# Patient Record
Sex: FEMALE
[De-identification: names, ages, dates, MRNs, and addresses within clinical notes are randomized per-mention and may not be internally consistent; named-entity substitution may affect disease eponyms.]

---

## 2017-11-14 PROBLEM — Z00.00 ENCOUNTER FOR PREVENTIVE HEALTH EXAMINATION: Status: ACTIVE | Noted: 2017-11-14

## 2017-11-15 ENCOUNTER — LABORATORY RESULT (OUTPATIENT)
Age: 47
End: 2017-11-15

## 2017-11-15 ENCOUNTER — APPOINTMENT (OUTPATIENT)
Dept: ENDOCRINOLOGY | Facility: CLINIC | Age: 47
End: 2017-11-15
Payer: MEDICAID

## 2017-11-15 VITALS
BODY MASS INDEX: 29.02 KG/M2 | WEIGHT: 170 LBS | DIASTOLIC BLOOD PRESSURE: 83 MMHG | HEART RATE: 66 BPM | SYSTOLIC BLOOD PRESSURE: 127 MMHG | HEIGHT: 64 IN

## 2017-11-15 DIAGNOSIS — E55.9 VITAMIN D DEFICIENCY, UNSPECIFIED: ICD-10-CM

## 2017-11-15 DIAGNOSIS — E01.0 IODINE-DEFICIENCY RELATED DIFFUSE (ENDEMIC) GOITER: ICD-10-CM

## 2017-11-15 DIAGNOSIS — Z83.49 FAMILY HISTORY OF OTHER ENDOCRINE, NUTRITIONAL AND METABOLIC DISEASES: ICD-10-CM

## 2017-11-15 DIAGNOSIS — Z13.29 ENCOUNTER FOR SCREENING FOR OTHER SUSPECTED ENDOCRINE DISORDER: ICD-10-CM

## 2017-11-15 PROCEDURE — 99204 OFFICE O/P NEW MOD 45 MIN: CPT

## 2017-11-16 LAB
25(OH)D3 SERPL-MCNC: 10.2 NG/ML
ALBUMIN SERPL ELPH-MCNC: 4.9 G/DL
ALP BLD-CCNC: 77 U/L
ALT SERPL-CCNC: 23 U/L
ANION GAP SERPL CALC-SCNC: 19 MMOL/L
AST SERPL-CCNC: 19 U/L
BILIRUB SERPL-MCNC: 0.2 MG/DL
BUN SERPL-MCNC: 10 MG/DL
CALCIUM SERPL-MCNC: 10.3 MG/DL
CHLORIDE SERPL-SCNC: 102 MMOL/L
CO2 SERPL-SCNC: 22 MMOL/L
CREAT SERPL-MCNC: 0.68 MG/DL
GLUCOSE SERPL-MCNC: 109 MG/DL
HBA1C MFR BLD HPLC: 6.3 %
POTASSIUM SERPL-SCNC: 4.7 MMOL/L
PROT SERPL-MCNC: 8.1 G/DL
SODIUM SERPL-SCNC: 143 MMOL/L
TSH SERPL-ACNC: 2.52 UIU/ML

## 2017-11-20 PROBLEM — E55.9 VITAMIN D INSUFFICIENCY: Status: ACTIVE | Noted: 2017-11-20

## 2017-11-20 RX ORDER — ERGOCALCIFEROL 1.25 MG/1
1.25 MG CAPSULE, LIQUID FILLED ORAL
Qty: 12 | Refills: 3 | Status: ACTIVE | COMMUNITY
Start: 2017-11-20 | End: 1900-01-01

## 2017-11-22 ENCOUNTER — RESULT REVIEW (OUTPATIENT)
Age: 47
End: 2017-11-22

## 2017-12-06 ENCOUNTER — RESULT REVIEW (OUTPATIENT)
Age: 47
End: 2017-12-06

## 2017-12-18 ENCOUNTER — APPOINTMENT (OUTPATIENT)
Dept: ENDOCRINOLOGY | Facility: CLINIC | Age: 47
End: 2017-12-18

## 2017-12-27 ENCOUNTER — APPOINTMENT (OUTPATIENT)
Dept: ENDOCRINOLOGY | Facility: CLINIC | Age: 47
End: 2017-12-27
Payer: MEDICAID

## 2017-12-27 VITALS
HEIGHT: 64 IN | HEART RATE: 98 BPM | WEIGHT: 158 LBS | DIASTOLIC BLOOD PRESSURE: 81 MMHG | BODY MASS INDEX: 26.98 KG/M2 | SYSTOLIC BLOOD PRESSURE: 136 MMHG

## 2017-12-27 PROCEDURE — 36415 COLL VENOUS BLD VENIPUNCTURE: CPT

## 2017-12-27 PROCEDURE — 99214 OFFICE O/P EST MOD 30 MIN: CPT | Mod: 25

## 2018-01-04 ENCOUNTER — OTHER (OUTPATIENT)
Age: 48
End: 2018-01-04

## 2018-01-04 DIAGNOSIS — E05.90 THYROTOXICOSIS, UNSPECIFIED W/OUT THYROTOXIC CRISIS OR STORM: ICD-10-CM

## 2018-01-04 LAB
ALBUMIN SERPL ELPH-MCNC: 4.2 G/DL
ALP BLD-CCNC: 68 U/L
ALT SERPL-CCNC: 17 U/L
ANION GAP SERPL CALC-SCNC: 15 MMOL/L
AST SERPL-CCNC: 85 U/L
BILIRUB SERPL-MCNC: 0.2 MG/DL
BUN SERPL-MCNC: 13 MG/DL
CALCIUM SERPL-MCNC: 10.3 MG/DL
CHLORIDE SERPL-SCNC: 98 MMOL/L
CO2 SERPL-SCNC: 25 MMOL/L
CREAT SERPL-MCNC: 0.97 MG/DL
GLUCOSE SERPL-MCNC: 86 MG/DL
POTASSIUM SERPL-SCNC: 4.5 MMOL/L
PROT SERPL-MCNC: 8.9 G/DL
SODIUM SERPL-SCNC: 138 MMOL/L
T3FREE SERPL-MCNC: 4.57 PG/ML
T4 FREE SERPL-MCNC: 2.4 NG/DL
THYROGLOB AB SERPL-ACNC: <20 IU/ML
THYROPEROXIDASE AB SERPL IA-ACNC: 17.6 IU/ML
TSH SERPL-ACNC: 0.02 UIU/ML

## 2018-01-08 ENCOUNTER — LABORATORY RESULT (OUTPATIENT)
Age: 48
End: 2018-01-08

## 2018-01-10 ENCOUNTER — APPOINTMENT (OUTPATIENT)
Dept: ENDOCRINOLOGY | Facility: CLINIC | Age: 48
End: 2018-01-10
Payer: MEDICAID

## 2018-01-10 VITALS
DIASTOLIC BLOOD PRESSURE: 71 MMHG | HEIGHT: 64 IN | BODY MASS INDEX: 27.31 KG/M2 | WEIGHT: 160 LBS | SYSTOLIC BLOOD PRESSURE: 116 MMHG

## 2018-01-10 PROCEDURE — 99214 OFFICE O/P EST MOD 30 MIN: CPT

## 2018-01-16 ENCOUNTER — APPOINTMENT (OUTPATIENT)
Dept: ENDOCRINOLOGY | Facility: CLINIC | Age: 48
End: 2018-01-16

## 2018-03-28 ENCOUNTER — APPOINTMENT (OUTPATIENT)
Dept: ENDOCRINOLOGY | Facility: CLINIC | Age: 48
End: 2018-03-28

## 2018-04-25 ENCOUNTER — LABORATORY RESULT (OUTPATIENT)
Age: 48
End: 2018-04-25

## 2018-04-25 ENCOUNTER — APPOINTMENT (OUTPATIENT)
Dept: ENDOCRINOLOGY | Facility: CLINIC | Age: 48
End: 2018-04-25
Payer: MEDICAID

## 2018-04-25 VITALS
WEIGHT: 160 LBS | DIASTOLIC BLOOD PRESSURE: 61 MMHG | BODY MASS INDEX: 27.31 KG/M2 | HEIGHT: 64 IN | SYSTOLIC BLOOD PRESSURE: 128 MMHG

## 2018-04-25 PROCEDURE — 99213 OFFICE O/P EST LOW 20 MIN: CPT

## 2018-05-30 ENCOUNTER — LABORATORY RESULT (OUTPATIENT)
Age: 48
End: 2018-05-30

## 2018-05-30 ENCOUNTER — APPOINTMENT (OUTPATIENT)
Dept: ENDOCRINOLOGY | Facility: CLINIC | Age: 48
End: 2018-05-30
Payer: MEDICAID

## 2018-05-30 VITALS — DIASTOLIC BLOOD PRESSURE: 50 MMHG | SYSTOLIC BLOOD PRESSURE: 110 MMHG | HEIGHT: 64 IN

## 2018-05-30 DIAGNOSIS — E06.1 SUBACUTE THYROIDITIS: ICD-10-CM

## 2018-05-30 PROCEDURE — 99214 OFFICE O/P EST MOD 30 MIN: CPT

## 2018-05-30 RX ORDER — LEVOTHYROXINE SODIUM 0.05 MG/1
50 TABLET ORAL DAILY
Qty: 30 | Refills: 3 | Status: ACTIVE | COMMUNITY
Start: 2018-05-30 | End: 1900-01-01

## 2018-06-20 ENCOUNTER — OTHER (OUTPATIENT)
Age: 48
End: 2018-06-20

## 2018-07-25 ENCOUNTER — APPOINTMENT (OUTPATIENT)
Dept: ENDOCRINOLOGY | Facility: CLINIC | Age: 48
End: 2018-07-25

## 2018-08-01 ENCOUNTER — APPOINTMENT (OUTPATIENT)
Dept: ENDOCRINOLOGY | Facility: CLINIC | Age: 48
End: 2018-08-01
Payer: MEDICAID

## 2018-08-01 VITALS
SYSTOLIC BLOOD PRESSURE: 128 MMHG | WEIGHT: 176 LBS | HEIGHT: 64 IN | BODY MASS INDEX: 30.05 KG/M2 | DIASTOLIC BLOOD PRESSURE: 70 MMHG

## 2018-08-01 PROCEDURE — 99213 OFFICE O/P EST LOW 20 MIN: CPT

## 2018-08-16 ENCOUNTER — OTHER (OUTPATIENT)
Age: 48
End: 2018-08-16

## 2018-08-16 ENCOUNTER — RESULT CHARGE (OUTPATIENT)
Age: 48
End: 2018-08-16

## 2018-08-16 DIAGNOSIS — E03.9 HYPOTHYROIDISM, UNSPECIFIED: ICD-10-CM

## 2018-08-16 RX ORDER — LEVOTHYROXINE SODIUM 0.05 MG/1
50 TABLET ORAL
Qty: 90 | Refills: 2 | Status: ACTIVE | COMMUNITY
Start: 2018-08-16 | End: 1900-01-01

## 2018-10-03 ENCOUNTER — APPOINTMENT (OUTPATIENT)
Dept: ENDOCRINOLOGY | Facility: CLINIC | Age: 48
End: 2018-10-03
Payer: MEDICAID

## 2018-10-03 PROCEDURE — 99213 OFFICE O/P EST LOW 20 MIN: CPT

## 2018-10-04 ENCOUNTER — LABORATORY RESULT (OUTPATIENT)
Age: 48
End: 2018-10-04

## 2018-10-05 VITALS
DIASTOLIC BLOOD PRESSURE: 71 MMHG | SYSTOLIC BLOOD PRESSURE: 131 MMHG | WEIGHT: 192 LBS | BODY MASS INDEX: 32.78 KG/M2 | HEIGHT: 64 IN

## 2018-10-09 ENCOUNTER — LABORATORY RESULT (OUTPATIENT)
Age: 48
End: 2018-10-09

## 2018-11-21 ENCOUNTER — RESULT CHARGE (OUTPATIENT)
Age: 48
End: 2018-11-21

## 2018-12-05 ENCOUNTER — APPOINTMENT (OUTPATIENT)
Dept: ENDOCRINOLOGY | Facility: CLINIC | Age: 48
End: 2018-12-05

## 2019-01-16 ENCOUNTER — LABORATORY RESULT (OUTPATIENT)
Age: 49
End: 2019-01-16

## 2019-01-16 ENCOUNTER — APPOINTMENT (OUTPATIENT)
Dept: ENDOCRINOLOGY | Facility: CLINIC | Age: 49
End: 2019-01-16
Payer: MEDICAID

## 2019-01-16 VITALS
HEIGHT: 64 IN | DIASTOLIC BLOOD PRESSURE: 79 MMHG | SYSTOLIC BLOOD PRESSURE: 116 MMHG | BODY MASS INDEX: 30.05 KG/M2 | WEIGHT: 176 LBS

## 2019-01-16 DIAGNOSIS — E04.2 NONTOXIC MULTINODULAR GOITER: ICD-10-CM

## 2019-01-16 PROCEDURE — 99214 OFFICE O/P EST MOD 30 MIN: CPT

## 2019-01-17 PROBLEM — E04.2 MULTINODULAR GOITER (NONTOXIC): Status: ACTIVE | Noted: 2017-11-15

## 2019-01-17 NOTE — HISTORY OF PRESENT ILLNESS
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 October 2017 thyroid u/s report: \par Right lobe: 3 nodules 0.2 x 0.2 x 0.2; 0.3 x 0.2 x 0.3 and 0.2 x 0.2 x 0.2\par Left lobe: nodule 3.6 x 1.2 x 1.9.\par 11/23/17. Thyroid FNA left mid pole: [place]: II.\par Initial endocrine visit on November 15, 2017, clinically euthyroid with tsh 2.5\par 12/27/17 TPO 17.6, t4 free 2.4\par 1/8/18,tsh 0.61, t4 free 0.9  ,tshrab : neg, TPO ab 17.6\par 4/25/18, TSH 6.30,T4 1.0,\par May 30,2018,TSH 6.1, T4 free 1.0\par 8/2/18, t4 free 1.2\par Overall she is doing well, denies palpitation, weight loss\par Meds lt4 50 mcg since May 2018 until the summer of 2018\par She is feeling well, no c/o. She had a thyroid u/s in [place] , no nodules.\par Denies palpitations,GI disturbances

## 2019-01-17 NOTE — REVIEW OF SYSTEMS
[Dysphagia] : no dysphagia [Dysphonia] : no dysphonia [Polydipsia] : no polydipsia [Cold Intolerance] : cold tolerant [Heat Intolerance] : heat tolerant [Negative] : Respiratory

## 2019-01-17 NOTE — ASSESSMENT
[FreeTextEntry1] : #1- thyroid nodules. FNA biopsy : [place] II\par Asymptomatic\par Monitor thyroid nodule size growth rate (  Left 3.6 cm nodule)\par Order thyroid u/s\par \par #2 Thyroiditis, Nov 2017\par She is clinically euthyroid, monitor TFT\par \par Return in 2 months

## 2019-01-17 NOTE — PHYSICAL EXAM
[Alert] : alert [Normal Sclera/Conjunctiva] : normal sclera/conjunctiva [Carotids Normal] : carotid pulses were normal with no bruits [Normal Bowel Sounds] : normal bowel sounds [de-identified] : thyroid nodularities

## 2019-02-27 ENCOUNTER — APPOINTMENT (OUTPATIENT)
Dept: ENDOCRINOLOGY | Facility: CLINIC | Age: 49
End: 2019-02-27
Payer: MEDICAID

## 2019-02-27 VITALS
SYSTOLIC BLOOD PRESSURE: 117 MMHG | DIASTOLIC BLOOD PRESSURE: 77 MMHG | WEIGHT: 174 LBS | BODY MASS INDEX: 29.71 KG/M2 | HEIGHT: 64 IN

## 2019-02-27 DIAGNOSIS — Z86.39 PERSONAL HISTORY OF OTHER ENDOCRINE, NUTRITIONAL AND METABOLIC DISEASE: ICD-10-CM

## 2019-02-27 DIAGNOSIS — E04.1 NONTOXIC SINGLE THYROID NODULE: ICD-10-CM

## 2019-02-27 PROCEDURE — 99213 OFFICE O/P EST LOW 20 MIN: CPT

## 2019-02-27 NOTE — ASSESSMENT
[FreeTextEntry1] : #1- thyroid nodules. FNA biopsy : [place] II\par Asymptomatic\par Monitor thyroid nodule size growth rate (  Left 3.6 cm nodule)\par Jan 2019 thyroid u/s lef  1.0 x 0.4 cm nodule\par Thyroid u/s in 2 years\par \par #2 Thyroiditis, Nov 2017\par She is clinically euthyroid, clinically euthyroid\par No need for thyroid supplementation ( lt4 held October 2018)\par Monitor TFT\par \par Return in May if  TFT is abn

## 2019-02-27 NOTE — REVIEW OF SYSTEMS
[Negative] : Psychiatric [Redness] : no redness  [Dysphagia] : no dysphagia [Dysphonia] : no dysphonia [Headache] : no headaches [Tremors] : no tremors [Polydipsia] : no polydipsia [Cold Intolerance] : cold tolerant [Heat Intolerance] : heat tolerant

## 2019-02-27 NOTE — PHYSICAL EXAM
[Alert] : alert [Normal Sclera/Conjunctiva] : normal sclera/conjunctiva [Carotids Normal] : carotid pulses were normal with no bruits [Normal Bowel Sounds] : normal bowel sounds [de-identified] : thyroid nodularities

## 2023-02-05 NOTE — HISTORY OF PRESENT ILLNESS
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 October 2017 thyroid u/s report: \par Right lobe: 3 nodules 0.2 x 0.2 x 0.2; 0.3 x 0.2 x 0.3 and 0.2 x 0.2 x 0.2\par Left lobe: nodule 3.6 x 1.2 x 1.9.\par 11/23/17. Thyroid FNA left mid pole: [place]: II.\par Initial endocrine visit on November 15, 2017, clinically euthyroid with tsh 2.5\par 12/27/17 TPO 17.6, t4 free 2.4\par 1/8/18,tsh 0.61, t4 free 0.9  ,tshrab : neg, TPO ab 17.6\par 4/25/18, TSH 6.30,T4 1.0,\par May 30,2018,TSH 6.1, T4 free 1.0\par 8/2/18, t4 free 1.2\par Overall she is doing well, denies palpitation, weight loss\par Meds lt4 50 mcg since May 2018 until the summer of 2018\par She is feeling well, no c/o. She had a thyroid u/s in [place] , no nodules.\par Denies palpitations,GI disturbances\par 2/27/19\par On october 9 lt4 was held\par C/o of tiredness\par  Clinically euthyroid\par  Denies palpitations,weight changes, hands tremors\par  1/16/19 tsh 5.5, t4 free 1.2 \par  1/17/19, thyroid ultrasound: right thyroid lobe no nodules\par                                                left lobe: 1.1 x 0.4 cm hypoechoic
Alcohol/Cannabis